# Patient Record
Sex: MALE | Race: WHITE | NOT HISPANIC OR LATINO | URBAN - NONMETROPOLITAN AREA
[De-identification: names, ages, dates, MRNs, and addresses within clinical notes are randomized per-mention and may not be internally consistent; named-entity substitution may affect disease eponyms.]

---

## 2021-01-28 ENCOUNTER — OFFICE VISIT (OUTPATIENT)
Dept: URBAN - NONMETROPOLITAN AREA CLINIC 2 | Facility: CLINIC | Age: 21
End: 2021-01-28
Payer: COMMERCIAL

## 2021-01-28 ENCOUNTER — WEB ENCOUNTER (OUTPATIENT)
Dept: URBAN - NONMETROPOLITAN AREA CLINIC 2 | Facility: CLINIC | Age: 21
End: 2021-01-28

## 2021-01-28 DIAGNOSIS — R10.84 GENERALIZED ABDOMINAL PAIN: ICD-10-CM

## 2021-01-28 DIAGNOSIS — K58.9 IBS (IRRITABLE BOWEL SYNDROME): ICD-10-CM

## 2021-01-28 DIAGNOSIS — R19.7 DIARRHEA: ICD-10-CM

## 2021-01-28 PROBLEM — 10743008 IRRITABLE BOWEL SYNDROME: Status: ACTIVE | Noted: 2021-01-28

## 2021-01-28 PROCEDURE — G8482 FLU IMMUNIZE ORDER/ADMIN: HCPCS | Performed by: INTERNAL MEDICINE

## 2021-01-28 PROCEDURE — 1036F TOBACCO NON-USER: CPT | Performed by: INTERNAL MEDICINE

## 2021-01-28 PROCEDURE — G9903 PT SCRN TBCO ID AS NON USER: HCPCS | Performed by: INTERNAL MEDICINE

## 2021-01-28 PROCEDURE — 99204 OFFICE O/P NEW MOD 45 MIN: CPT | Performed by: INTERNAL MEDICINE

## 2021-01-28 PROCEDURE — G8420 CALC BMI NORM PARAMETERS: HCPCS | Performed by: INTERNAL MEDICINE

## 2021-01-28 PROCEDURE — G8427 DOCREV CUR MEDS BY ELIG CLIN: HCPCS | Performed by: INTERNAL MEDICINE

## 2021-01-28 RX ORDER — PANTOPRAZOLE SODIUM 40 MG/1
1 TABLET TABLET, DELAYED RELEASE ORAL ONCE A DAY
Qty: 30 TABLET | Refills: 6 | OUTPATIENT
Start: 2021-01-28

## 2021-01-28 RX ORDER — RIFAXIMIN 550 MG/1
1 TABLET TABLET ORAL THREE TIMES A DAY
Qty: 42 TABLET | Refills: 0 | OUTPATIENT
Start: 2021-01-28 | End: 2021-02-11

## 2021-01-28 NOTE — HPI-TODAY'S VISIT:
Jon is a 20-year-old male who presents with abdominal pain and loose stool.  He reports he does have some longstanding history of some mild GI discomfort.  However, starting in November, he started developing some intermittent right upper quad and midline discomfort followed by loose stool.  On average she is having about 3 bowel movements a day.  Some days are better than others.  He does occasionally have nocturnal symptoms of he has had any alcohol the previous day.  Triggers include alcohol and caffeine, which she has tried to eliminate.  He states his sister has a history of IBS and some gastritis.  No additional complaints.  He just reports that he has had a lifelong sensitive stomach.  Otherwise, he is a healthy student. Sb

## 2021-01-30 LAB
A/G RATIO: 1.9
ALBUMIN: 5.1
ALKALINE PHOSPHATASE: 101
ALT (SGPT): 18
AST (SGOT): 18
BASO (ABSOLUTE): 0
BASOS: 0
BILIRUBIN, TOTAL: 0.5
BUN/CREATININE RATIO: 13
BUN: 12
C-REACTIVE PROTEIN, QUANT: <1
CALCIUM: 10.3
CARBON DIOXIDE, TOTAL: 27
CHLORIDE: 101
CREATININE: 0.94
DEAMIDATED GLIADIN ABS, IGA: 4
DEAMIDATED GLIADIN ABS, IGG: 2
EGFR IF AFRICN AM: 133
EGFR IF NONAFRICN AM: 115
ENDOMYSIAL ANTIBODY IGA: NEGATIVE
EOS (ABSOLUTE): 0.1
EOS: 1
GLOBULIN, TOTAL: 2.7
GLUCOSE: 91
HEMATOCRIT: 52.5
HEMATOLOGY COMMENTS:: (no result)
HEMOGLOBIN: 18
IMMATURE CELLS: (no result)
IMMATURE GRANS (ABS): 0
IMMATURE GRANULOCYTES: 0
IMMUNOGLOBULIN A, QN, SERUM: 190
LYMPHS (ABSOLUTE): 1.7
LYMPHS: 29
MCH: 31.1
MCHC: 34.3
MCV: 91
MONOCYTES(ABSOLUTE): 0.5
MONOCYTES: 8
NEUTROPHILS (ABSOLUTE): 3.7
NEUTROPHILS: 62
NRBC: (no result)
PLATELETS: 250
POTASSIUM: 4
PROTEIN, TOTAL: 7.8
RBC: 5.79
RDW: 11.8
REQUEST PROBLEM: (no result)
SEDIMENTATION RATE-WESTERGREN: (no result)
SODIUM: 140
T-TRANSGLUTAMINASE (TTG) IGA: <2
T-TRANSGLUTAMINASE (TTG) IGG: <2
T4,FREE(DIRECT): 1.1
TSH: 2.85
WBC: 6

## 2021-02-01 ENCOUNTER — WEB ENCOUNTER (OUTPATIENT)
Dept: URBAN - METROPOLITAN AREA CLINIC 92 | Facility: CLINIC | Age: 21
End: 2021-02-01

## 2021-02-02 ENCOUNTER — TELEPHONE ENCOUNTER (OUTPATIENT)
Dept: URBAN - METROPOLITAN AREA CLINIC 92 | Facility: CLINIC | Age: 21
End: 2021-02-02

## 2021-02-02 RX ORDER — METRONIDAZOLE 250 MG/1
1 TABLET TABLET, FILM COATED ORAL
Qty: 56 TABLET | OUTPATIENT
Start: 2021-02-02 | End: 2021-02-16

## 2021-02-04 ENCOUNTER — WEB ENCOUNTER (OUTPATIENT)
Dept: URBAN - NONMETROPOLITAN AREA CLINIC 2 | Facility: CLINIC | Age: 21
End: 2021-02-04

## 2021-02-04 RX ORDER — HYDROCORTISONE 25 MG/G
1 APPLICATION CREAM TOPICAL TWICE A DAY
Qty: 1 CANISTER | Refills: 3 | OUTPATIENT
Start: 2021-02-05 | End: 2021-04-02

## 2021-02-08 LAB
ADENOVIRUS F 40/41: NOT DETECTED
ASTROVIRUS: NOT DETECTED
C DIFFICILE TOXIN A/B: NOT DETECTED
CALPROTECTIN, FECAL: 49
CAMPYLOBACTER: NOT DETECTED
CRYPTOSPORIDIUM: NOT DETECTED
CYCLOSPORA CAYETANENSIS: NOT DETECTED
E COLI O157: (no result)
ENTAMOEBA HISTOLYTICA: NOT DETECTED
ENTEROAGGREGATIVE E COLI: NOT DETECTED
ENTEROPATHOGENIC E COLI: NOT DETECTED
ENTEROTOXIGENIC E COLI: NOT DETECTED
GIARDIA LAMBLIA: NOT DETECTED
NOROVIRUS GI/GII: NOT DETECTED
PLESIOMONAS SHIGELLOIDES: NOT DETECTED
ROTAVIRUS A: NOT DETECTED
SALMONELLA: NOT DETECTED
SAPOVIRUS: NOT DETECTED
SHIGA-TOXIN-PRODUCING E COLI: NOT DETECTED
SHIGELLA/ENTEROINVASIVE E COLI: NOT DETECTED
VIBRIO CHOLERAE: NOT DETECTED
VIBRIO: NOT DETECTED
YERSINIA ENTEROCOLITICA: NOT DETECTED

## 2021-02-09 ENCOUNTER — WEB ENCOUNTER (OUTPATIENT)
Dept: URBAN - METROPOLITAN AREA CLINIC 92 | Facility: CLINIC | Age: 21
End: 2021-02-09

## 2021-02-12 ENCOUNTER — WEB ENCOUNTER (OUTPATIENT)
Dept: URBAN - NONMETROPOLITAN AREA CLINIC 2 | Facility: CLINIC | Age: 21
End: 2021-02-12

## 2021-02-25 ENCOUNTER — OFFICE VISIT (OUTPATIENT)
Dept: URBAN - METROPOLITAN AREA TELEHEALTH 2 | Facility: TELEHEALTH | Age: 21
End: 2021-02-25
Payer: COMMERCIAL

## 2021-02-25 DIAGNOSIS — R10.13 EPIGASTRIC ABDOMINAL PAIN: ICD-10-CM

## 2021-02-25 DIAGNOSIS — R19.5 LOOSE STOOLS: ICD-10-CM

## 2021-02-25 PROCEDURE — 99214 OFFICE O/P EST MOD 30 MIN: CPT | Performed by: INTERNAL MEDICINE

## 2021-02-25 RX ORDER — HYDROCORTISONE 25 MG/G
1 APPLICATION CREAM TOPICAL TWICE A DAY
Qty: 1 CANISTER | Refills: 3 | Status: ACTIVE | COMMUNITY
Start: 2021-02-05 | End: 2021-04-02

## 2021-02-25 RX ORDER — PANTOPRAZOLE SODIUM 40 MG/1
1 TABLET TABLET, DELAYED RELEASE ORAL ONCE A DAY
Qty: 30 TABLET | Refills: 6 | Status: ACTIVE | COMMUNITY
Start: 2021-01-28

## 2021-02-25 NOTE — PHYSICAL EXAM EYES:
Conjuntivae and eyelids appear normal , Sclerae : White without injection , Conjuntivae and eyelids appear normal , Sclerae : White without injection , Conjuntivae and eyelids appear normal , Sclerae : White without injection

## 2021-02-25 NOTE — PHYSICAL EXAM CONSTITUTIONAL:
pleasant, well nourished, well developed, in no acute distress , normal communication ability , pleasant, well nourished, well developed, in no acute distress , normal communication ability , pleasant, well nourished, well developed, in no acute distress , normal communication ability

## 2021-02-25 NOTE — PHYSICAL EXAM NECK/THYROID:
normal appearance , no deformities , normal appearance , no deformities , normal appearance , no deformities

## 2021-02-25 NOTE — HPI-TODAY'S VISIT:
2/25/21: Jon returns via telehealth for follow-up of right upper quadrant and epigastric abdominal pain as well as loose stools.  Since his last clinic visit, he had a normal CBC, CMP, GPP panel, calprotectin, and TSH/fT4.  Today he reports that his symptoms are about the same.  He asks about endoscopy and colonoscopy.  1/20/21: Jon is a 20-year-old male who presents with abdominal pain and loose stool.  He reports he does have some longstanding history of some mild GI discomfort.  However, starting in November, he started developing some intermittent right upper quad and midline discomfort followed by loose stool.  On average she is having about 3 bowel movements a day.  Some days are better than others.  He does occasionally have nocturnal symptoms of he has had any alcohol the previous day.  Triggers include alcohol and caffeine, which she has tried to eliminate.  He states his sister has a history of IBS and some gastritis.  No additional complaints.  He just reports that he has had a lifelong sensitive stomach.  Otherwise, he is a healthy student. Sb

## 2021-02-25 NOTE — PHYSICAL EXAM PSYCH:
normal mood with appropriate affect , normal mood with appropriate affect , normal mood with appropriate affect

## 2021-02-25 NOTE — PHYSICAL EXAM SKIN:
no rashes , no suspicious lesions , no areas of discoloration , no rashes , no suspicious lesions , no areas of discoloration , no rashes , no suspicious lesions , no areas of discoloration

## 2021-02-25 NOTE — PHYSICAL EXAM HENT:
Head , normocephalic , atraumatic , Face , Face within normal limits , Ears , External ears within normal limits , Nose/Nasopharynx , External nose  normal appearance , Mouth and Throat , Oral cavity appearance normal , Head , normocephalic , atraumatic , Face , Face within normal limits , Ears , External ears within normal limits , Nose/Nasopharynx , External nose  normal appearance , Mouth and Throat , Oral cavity appearance normal , Head , normocephalic , atraumatic , Face , Face within normal limits , Ears , External ears within normal limits , Nose/Nasopharynx , External nose  normal appearance , Mouth and Throat , Oral cavity appearance normal

## 2021-02-25 NOTE — PHYSICAL EXAM CHEST:
breathing is unlabored without accessory muscle use. , breathing is unlabored without accessory muscle use. , breathing is unlabored without accessory muscle use.

## 2021-02-25 NOTE — PHYSICAL EXAM GASTROINTESTINAL
Self Exam: Abdomen soft, non-tender to palpatation, non-distended , Self Exam: Abdomen soft, non-tender to palpatation, non-distended , Self Exam: Abdomen soft, non-tender to palpatation, non-distended

## 2021-02-25 NOTE — PHYSICAL EXAM NEUROLOGIC:
oriented to person, place and time , oriented to person, place and time , oriented to person, place and time

## 2021-03-12 ENCOUNTER — WEB ENCOUNTER (OUTPATIENT)
Dept: URBAN - NONMETROPOLITAN AREA CLINIC 2 | Facility: CLINIC | Age: 21
End: 2021-03-12

## 2021-03-15 ENCOUNTER — WEB ENCOUNTER (OUTPATIENT)
Dept: URBAN - NONMETROPOLITAN AREA CLINIC 2 | Facility: CLINIC | Age: 21
End: 2021-03-15

## 2021-03-26 ENCOUNTER — OFFICE VISIT (OUTPATIENT)
Dept: URBAN - NONMETROPOLITAN AREA SURGERY CENTER 1 | Facility: SURGERY CENTER | Age: 21
End: 2021-03-26
Payer: COMMERCIAL

## 2021-03-26 DIAGNOSIS — R19.7 ACUTE DIARRHEA: ICD-10-CM

## 2021-03-26 DIAGNOSIS — R10.13 ABDOMINAL DISCOMFORT, EPIGASTRIC: ICD-10-CM

## 2021-03-26 PROCEDURE — 45380 COLONOSCOPY AND BIOPSY: CPT | Performed by: INTERNAL MEDICINE

## 2021-03-26 PROCEDURE — G8907 PT DOC NO EVENTS ON DISCHARG: HCPCS | Performed by: INTERNAL MEDICINE

## 2021-03-26 PROCEDURE — 43239 EGD BIOPSY SINGLE/MULTIPLE: CPT | Performed by: INTERNAL MEDICINE

## 2021-03-26 RX ORDER — HYDROCORTISONE 25 MG/G
1 APPLICATION CREAM TOPICAL TWICE A DAY
Qty: 1 CANISTER | Refills: 3 | Status: ACTIVE | COMMUNITY
Start: 2021-02-05 | End: 2021-04-02

## 2021-03-26 RX ORDER — PANTOPRAZOLE SODIUM 40 MG/1
1 TABLET TABLET, DELAYED RELEASE ORAL ONCE A DAY
Qty: 30 TABLET | Refills: 6 | Status: ACTIVE | COMMUNITY
Start: 2021-01-28

## 2021-04-09 ENCOUNTER — OFFICE VISIT (OUTPATIENT)
Dept: URBAN - NONMETROPOLITAN AREA CLINIC 2 | Facility: CLINIC | Age: 21
End: 2021-04-09
Payer: COMMERCIAL

## 2021-04-09 DIAGNOSIS — R19.5 LOOSE STOOLS: ICD-10-CM

## 2021-04-09 DIAGNOSIS — R10.13 EPIGASTRIC ABDOMINAL PAIN: ICD-10-CM

## 2021-04-09 PROCEDURE — 99213 OFFICE O/P EST LOW 20 MIN: CPT | Performed by: INTERNAL MEDICINE

## 2021-04-09 RX ORDER — PANTOPRAZOLE SODIUM 40 MG/1
1 TABLET TABLET, DELAYED RELEASE ORAL ONCE A DAY
Qty: 30 TABLET | Refills: 6 | Status: ACTIVE | COMMUNITY
Start: 2021-01-28

## 2021-04-09 RX ORDER — NITAZOXANIDE 500 MG/1
1 TABLET WITH FOOD TABLET ORAL
Qty: 6 | OUTPATIENT
Start: 2021-04-09 | End: 2021-04-12

## 2021-04-09 NOTE — HPI-TODAY'S VISIT:
4/9/2021 Jon is a 20-year-old male who returns for follow-up of GI complaints.  Since his last office visit, he underwent a normal endoscopy and colonoscopy.  He states he actually felt well after the test, up until about 4 days ago.  He feels that at night he can physically see something moving in his upper abdomen.  He reports for many years his sister was told she had IBS and had multiple negative OandP tests.  He reported his sister saw worms in her stool.  He reports she was denied care by her physician for this and use some supplements that resolved all her GI complaints and she no longer saw any worms. Sb   2/25/21: Jon returns via telehealth for follow-up of right upper quadrant and epigastric abdominal pain as well as loose stools.  Since his last clinic visit, he had a normal CBC, CMP, GPP panel, calprotectin, and TSH/fT4.  Today he reports that his symptoms are about the same.  He asks about endoscopy and colonoscopy.  1/20/21: Jon is a 20-year-old male who presents with abdominal pain and loose stool.  He reports he does have some longstanding history of some mild GI discomfort.  However, starting in November, he started developing some intermittent right upper quad and midline discomfort followed by loose stool.  On average she is having about 3 bowel movements a day.  Some days are better than others.  He does occasionally have nocturnal symptoms of he has had any alcohol the previous day.  Triggers include alcohol and caffeine, which she has tried to eliminate.  He states his sister has a history of IBS and some gastritis.  No additional complaints.  He just reports that he has had a lifelong sensitive stomach.  Otherwise, he is a healthy student. Sb

## 2021-04-12 ENCOUNTER — WEB ENCOUNTER (OUTPATIENT)
Dept: URBAN - NONMETROPOLITAN AREA CLINIC 2 | Facility: CLINIC | Age: 21
End: 2021-04-12

## 2021-04-13 ENCOUNTER — WEB ENCOUNTER (OUTPATIENT)
Dept: URBAN - NONMETROPOLITAN AREA CLINIC 2 | Facility: CLINIC | Age: 21
End: 2021-04-13

## 2021-04-19 ENCOUNTER — OFFICE VISIT (OUTPATIENT)
Dept: URBAN - NONMETROPOLITAN AREA CLINIC 13 | Facility: CLINIC | Age: 21
End: 2021-04-19
Payer: COMMERCIAL

## 2021-04-19 DIAGNOSIS — R10.13 EPIGASTRIC ABDOMINAL PAIN: ICD-10-CM

## 2021-04-19 DIAGNOSIS — R19.5 LOOSE STOOLS: ICD-10-CM

## 2021-04-19 DIAGNOSIS — R14.0 BLOATING: ICD-10-CM

## 2021-04-19 PROCEDURE — 74177 CT ABD & PELVIS W/CONTRAST: CPT | Performed by: INTERNAL MEDICINE

## 2021-04-19 PROCEDURE — 99214 OFFICE O/P EST MOD 30 MIN: CPT | Performed by: INTERNAL MEDICINE

## 2021-04-19 RX ORDER — PANTOPRAZOLE SODIUM 40 MG/1
1 TABLET TABLET, DELAYED RELEASE ORAL ONCE A DAY
Qty: 30 TABLET | Refills: 6 | Status: ACTIVE | COMMUNITY
Start: 2021-01-28

## 2021-04-19 NOTE — HPI-TODAY'S VISIT:
4/19/21 Jon returns for f/u of abdominal discomfort. no changes since last OV. I did treat for Ova per pt request, but it made no changes in his symptoms. he is open to radiology tests at this point. SB  4/9/2021 Jon is a 20-year-old male who returns for follow-up of GI complaints.  Since his last office visit, he underwent a normal endoscopy and colonoscopy.  He states he actually felt well after the test, up until about 4 days ago.  He feels that at night he can physically see something moving in his upper abdomen.  He reports for many years his sister was told she had IBS and had multiple negative OandP tests.  He reported his sister saw worms in her stool.  He reports she was denied care by her physician for this and use some supplements that resolved all her GI complaints and she no longer saw any worms. Sb   2/25/21: Jon returns via telehealth for follow-up of right upper quadrant and epigastric abdominal pain as well as loose stools.  Since his last clinic visit, he had a normal CBC, CMP, GPP panel, calprotectin, and TSH/fT4.  Today he reports that his symptoms are about the same.  He asks about endoscopy and colonoscopy.  1/20/21: Jon is a 20-year-old male who presents with abdominal pain and loose stool.  He reports he does have some longstanding history of some mild GI discomfort.  However, starting in November, he started developing some intermittent right upper quad and midline discomfort followed by loose stool.  On average she is having about 3 bowel movements a day.  Some days are better than others.  He does occasionally have nocturnal symptoms of he has had any alcohol the previous day.  Triggers include alcohol and caffeine, which she has tried to eliminate.  He states his sister has a history of IBS and some gastritis.  No additional complaints.  He just reports that he has had a lifelong sensitive stomach.  Otherwise, he is a healthy student. Sb

## 2021-04-22 ENCOUNTER — WEB ENCOUNTER (OUTPATIENT)
Dept: URBAN - NONMETROPOLITAN AREA CLINIC 2 | Facility: CLINIC | Age: 21
End: 2021-04-22

## 2021-04-26 ENCOUNTER — TELEPHONE ENCOUNTER (OUTPATIENT)
Dept: URBAN - NONMETROPOLITAN AREA CLINIC 13 | Facility: CLINIC | Age: 21
End: 2021-04-26

## 2021-04-27 ENCOUNTER — TELEPHONE ENCOUNTER (OUTPATIENT)
Dept: URBAN - METROPOLITAN AREA CLINIC 23 | Facility: CLINIC | Age: 21
End: 2021-04-27

## 2021-04-29 ENCOUNTER — WEB ENCOUNTER (OUTPATIENT)
Dept: URBAN - NONMETROPOLITAN AREA CLINIC 2 | Facility: CLINIC | Age: 21
End: 2021-04-29

## 2021-04-29 ENCOUNTER — TELEPHONE ENCOUNTER (OUTPATIENT)
Dept: URBAN - METROPOLITAN AREA CLINIC 92 | Facility: CLINIC | Age: 21
End: 2021-04-29

## 2021-05-01 ENCOUNTER — WEB ENCOUNTER (OUTPATIENT)
Dept: URBAN - NONMETROPOLITAN AREA CLINIC 2 | Facility: CLINIC | Age: 21
End: 2021-05-01

## 2021-05-09 ENCOUNTER — WEB ENCOUNTER (OUTPATIENT)
Dept: URBAN - NONMETROPOLITAN AREA CLINIC 2 | Facility: CLINIC | Age: 21
End: 2021-05-09

## 2021-05-10 ENCOUNTER — WEB ENCOUNTER (OUTPATIENT)
Dept: URBAN - NONMETROPOLITAN AREA CLINIC 2 | Facility: CLINIC | Age: 21
End: 2021-05-10

## 2021-05-10 ENCOUNTER — WEB ENCOUNTER (OUTPATIENT)
Dept: URBAN - METROPOLITAN AREA CLINIC 92 | Facility: CLINIC | Age: 21
End: 2021-05-10

## 2021-05-10 RX ORDER — METOCLOPRAMIDE HYDROCHLORIDE 5 MG/1
1 TABLET BEFORE MEALS TABLET ORAL
Qty: 120 TABLET | Refills: 3 | OUTPATIENT
Start: 2021-05-10

## 2021-05-14 ENCOUNTER — DASHBOARD ENCOUNTERS (OUTPATIENT)
Age: 21
End: 2021-05-14

## 2021-05-14 ENCOUNTER — OFFICE VISIT (OUTPATIENT)
Dept: URBAN - NONMETROPOLITAN AREA CLINIC 2 | Facility: CLINIC | Age: 21
End: 2021-05-14
Payer: COMMERCIAL

## 2021-05-14 DIAGNOSIS — K31.84 GASTROPARESIS: ICD-10-CM

## 2021-05-14 PROBLEM — 235675006: Status: ACTIVE | Noted: 2021-05-14

## 2021-05-14 PROCEDURE — 99213 OFFICE O/P EST LOW 20 MIN: CPT | Performed by: INTERNAL MEDICINE

## 2021-05-14 RX ORDER — METOCLOPRAMIDE HYDROCHLORIDE 5 MG/1
1 TABLET BEFORE MEALS TABLET ORAL
Qty: 120 TABLET | Refills: 3 | Status: ACTIVE | COMMUNITY
Start: 2021-05-10

## 2021-05-14 RX ORDER — PANTOPRAZOLE SODIUM 40 MG/1
1 TABLET TABLET, DELAYED RELEASE ORAL ONCE A DAY
Qty: 30 TABLET | Refills: 6 | Status: ACTIVE | COMMUNITY
Start: 2021-01-28

## 2021-05-14 RX ORDER — AMITRIPTYLINE HYDROCHLORIDE 10 MG/1
1 TABLET AT BEDTIME TABLET, FILM COATED ORAL ONCE A DAY
Qty: 30 | OUTPATIENT
Start: 2021-05-14

## 2021-05-14 RX ORDER — ONDANSETRON 4 MG/1
1 TABLET ON THE TONGUE AND ALLOW TO DISSOLVE TABLET, ORALLY DISINTEGRATING ORAL
Qty: 30 TABLET | Refills: 0 | OUTPATIENT
Start: 2021-05-14

## 2021-05-14 NOTE — HPI-TODAY'S VISIT:
5/14/21 Jon returns for f/u and to discuss newly dx GP. Since his last OV, we performed a GES of 112 min and had a CT. CT showed some questionable colon thickening (recent normal colon), enlarged spleen, and distended stomach consistent with GP. He has seen Dr Mathis and they are doing a workup of his enlarged spleen. He was started on reglan a few days ago and has already noticed an improvement in his epigastric discomfort and fullness. He has recently graduated and is moving to [employer] for a new position. SB  4/19/21 Jon returns for f/u of abdominal discomfort. no changes since last OV. I did treat for Ova per pt request, but it made no changes in his symptoms. he is open to radiology tests at this point. SB  4/9/2021 Jon is a 20-year-old male who returns for follow-up of GI complaints.  Since his last office visit, he underwent a normal endoscopy and colonoscopy.  He states he actually felt well after the test, up until about 4 days ago.  He feels that at night he can physically see something moving in his upper abdomen.  He reports for many years his sister was told she had IBS and had multiple negative OandP tests.  He reported his sister saw worms in her stool.  He reports she was denied care by her physician for this and use some supplements that resolved all her GI complaints and she no longer saw any worms. Sb   2/25/21: Jon returns via telehealth for follow-up of right upper quadrant and epigastric abdominal pain as well as loose stools.  Since his last clinic visit, he had a normal CBC, CMP, GPP panel, calprotectin, and TSH/fT4.  Today he reports that his symptoms are about the same.  He asks about endoscopy and colonoscopy.  1/20/21: Jon is a 20-year-old male who presents with abdominal pain and loose stool.  He reports he does have some longstanding history of some mild GI discomfort.  However, starting in November, he started developing some intermittent right upper quad and midline discomfort followed by loose stool.  On average she is having about 3 bowel movements a day.  Some days are better than others.  He does occasionally have nocturnal symptoms of he has had any alcohol the previous day.  Triggers include alcohol and caffeine, which she has tried to eliminate.  He states his sister has a history of IBS and some gastritis.  No additional complaints.  He just reports that he has had a lifelong sensitive stomach.  Otherwise, he is a healthy student. Sb

## 2021-05-19 ENCOUNTER — WEB ENCOUNTER (OUTPATIENT)
Dept: URBAN - NONMETROPOLITAN AREA CLINIC 2 | Facility: CLINIC | Age: 21
End: 2021-05-19

## 2021-05-27 ENCOUNTER — WEB ENCOUNTER (OUTPATIENT)
Dept: URBAN - NONMETROPOLITAN AREA CLINIC 2 | Facility: CLINIC | Age: 21
End: 2021-05-27

## 2021-06-07 ENCOUNTER — ERX REFILL RESPONSE (OUTPATIENT)
Dept: URBAN - NONMETROPOLITAN AREA CLINIC 2 | Facility: CLINIC | Age: 21
End: 2021-06-07

## 2021-06-07 ENCOUNTER — OFFICE VISIT (OUTPATIENT)
Dept: URBAN - NONMETROPOLITAN AREA CLINIC 13 | Facility: CLINIC | Age: 21
End: 2021-06-07

## 2021-06-07 RX ORDER — AMITRIPTYLINE HYDROCHLORIDE 10 MG/1
1 TABLET AT BEDTIME TABLET, FILM COATED ORAL ONCE A DAY
Qty: 30 | Refills: 0

## 2021-07-05 ENCOUNTER — ERX REFILL RESPONSE (OUTPATIENT)
Dept: URBAN - NONMETROPOLITAN AREA CLINIC 2 | Facility: CLINIC | Age: 21
End: 2021-07-05

## 2021-07-05 RX ORDER — AMITRIPTYLINE HYDROCHLORIDE 10 MG/1
TAKE 1 TABLET BY MOUTH EVERYDAY AT BEDTIME TABLET, FILM COATED ORAL
Qty: 30 TABLET | Refills: 1 | OUTPATIENT

## 2021-07-05 RX ORDER — AMITRIPTYLINE HYDROCHLORIDE 10 MG/1
1 TABLET AT BEDTIME TABLET, FILM COATED ORAL ONCE A DAY
Qty: 30 | Refills: 0 | OUTPATIENT

## 2021-07-15 ENCOUNTER — WEB ENCOUNTER (OUTPATIENT)
Dept: URBAN - NONMETROPOLITAN AREA CLINIC 2 | Facility: CLINIC | Age: 21
End: 2021-07-15

## 2021-07-21 ENCOUNTER — WEB ENCOUNTER (OUTPATIENT)
Dept: URBAN - NONMETROPOLITAN AREA CLINIC 2 | Facility: CLINIC | Age: 21
End: 2021-07-21

## 2021-08-01 ENCOUNTER — ERX REFILL RESPONSE (OUTPATIENT)
Dept: URBAN - NONMETROPOLITAN AREA CLINIC 2 | Facility: CLINIC | Age: 21
End: 2021-08-01

## 2021-08-01 RX ORDER — AMITRIPTYLINE HYDROCHLORIDE 10 MG/1
TAKE 1 TABLET BY MOUTH EVERYDAY AT BEDTIME TABLET, FILM COATED ORAL
Qty: 30 TABLET | Refills: 1 | OUTPATIENT